# Patient Record
Sex: MALE | Race: OTHER
[De-identification: names, ages, dates, MRNs, and addresses within clinical notes are randomized per-mention and may not be internally consistent; named-entity substitution may affect disease eponyms.]

---

## 2020-12-04 ENCOUNTER — HOSPITAL ENCOUNTER (EMERGENCY)
Dept: HOSPITAL 62 - ER | Age: 30
Discharge: HOME | End: 2020-12-04
Payer: SELF-PAY

## 2020-12-04 VITALS — SYSTOLIC BLOOD PRESSURE: 131 MMHG | DIASTOLIC BLOOD PRESSURE: 73 MMHG

## 2020-12-04 DIAGNOSIS — S62.637B: Primary | ICD-10-CM
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
## 2020-12-04 PROCEDURE — 73140 X-RAY EXAM OF FINGER(S): CPT

## 2020-12-04 PROCEDURE — 12031 INTMD RPR S/A/T/EXT 2.5 CM/<: CPT

## 2020-12-04 PROCEDURE — 99283 EMERGENCY DEPT VISIT LOW MDM: CPT

## 2020-12-04 NOTE — RADIOLOGY REPORT (SQ)
EXAM DESCRIPTION:  FINGER LEFT



IMAGES COMPLETED DATE/TIME:  12/4/2020 1:57 pm



REASON FOR STUDY:  laceration



COMPARISON:  None.



NUMBER OF VIEWS:  Three views.



TECHNIQUE:  AP, lateral, and oblique images acquired of the left fifth finger.



LIMITATIONS:  None.



FINDINGS:  MINERALIZATION: Normal.

BONES: Acute comminuted displaced fracture of the tuft of the 5th distal phalanx.  There is no other 
fracture or associated dislocation.

SOFT TISSUES: Soft tissue swelling around the fractured 5th digit.

OTHER: No other findings.



IMPRESSION:  Acute comminuted displaced fracture of the tuft of the 5th distal phalanx.



TECHNICAL DOCUMENTATION:  JOB ID:  [id number]

 2011 [employer]- All Rights Reserved



Reading location - IP/workstation name: [email or web address]

## 2020-12-04 NOTE — ER DOCUMENT REPORT
ED Hand/Wrist Injury





- General


Chief Complaint: Finger Injury


Stated Complaint: FINGER INJURY


Time Seen by Provider: 12/04/20 13:37


Mode of Arrival: Ambulatory


Information source: Patient


Notes: 





30-year-old male presented to ED for laceration to his left finger.  He states 

he was trying to cut vegetables when he cut his finger with a knife.  He states 

his last tetanus was in 2017.  He states the pain is a 3 out of 5 at this time 

sharp.  He did cut through the fingernail on the fifth finger it is actively 

bleeding.  He is alert oriented respirations regular nonlabored speaking in full

sentences.














Constitutional: Negative for fever.


HENT: Negative for sore throat.


Eyes: Negative for visual changes.


Cardiovascular: Negative for chest pain.


Respiratory: Negative for shortness of breath.


Gastrointestinal: Negative for abdominal pain, vomiting or diarrhea.


Genitourinary: Negative for dysuria.


Musculoskeletal: Negative for back pain.


Skin: Laceration to the nail into the finger of the left fifth finger


Neurological: Negative for headaches, weakness or numbness.





10 point ROS negative except as marked above and in HPI.














PHYSICAL EXAMINATION:





GENERAL: Well-appearing, well-nourished and in no acute distress.





HEAD: Atraumatic, normocephalic.





EYES: Pupils equal round extraocular movements intact,  conjunctiva are normal.





ENT: Nares patent





NECK: Normal range of motion





LUNGS: No respiratory distress





Musculoskeletal: Normal range of motion





NEUROLOGICAL:  Normal speech, normal gait. 





PSYCH: Normal mood, normal affect.





SKIN: Laceration to the nail into the finger on the left fifth finger it is 

actively bleeding





- HPI


Injury to: Small finger


Onset: Just prior to arrival


Where: Home, Indoors


Timing: Still present


Quality of pain: Achy, Sharp


Severity: Moderate


Pain Level: 3


Context: Laceration





Past Medical History





- General


Information source: Patient





- Social History


Smoking Status: Never Smoker


Frequency of alcohol use: Rare


Drug Abuse: None


Occupation: [employer]


Lives with: Family


Family History: Reviewed & Not Pertinent


Patient has suicidal ideation: No


Patient has homicidal ideation: No





- Past Medical History


Cardiac Medical History: Reports: None


Pulmonary Medical History: Reports: None


EENT Medical History: Reports: None


Neurological Medical History: Reports: None


Endocrine Medical History: Reports: None


Renal/ Medical History: Reports: None


Malignancy Medical History: Reports None


GI Medical History: Reports: None


Musculoskeletal Medical History: Reports Hx Musculoskeletal Deformity, Reports 

Hx Musculoskeletal Trauma


Skin Medical History: Reports None


Psychiatric Medical History: Reports: None


Traumatic Medical History: Reports: Hx Fractures - Left knee right arm


Infectious Medical History: Reports: None


Past Surgical History: Reports: Hx Orthopedic Surgery - Left knee right arm





- Immunizations


Immunizations up to date: Yes


Hx Diphtheria, Pertussis, Tetanus Vaccination: Yes - 2017





Physical Exam





- Vital signs


Vitals: 





                                        











Temp Pulse Resp BP Pulse Ox


 


 98.8 F   85   16   148/80 H  100 


 


 12/04/20 13:25  12/04/20 13:25  12/04/20 13:25  12/04/20 13:25  12/04/20 13:25














Course





- Re-evaluation


Re-evalutation: 





12/04/20 13:52


He does have a laceration to the nail of the left fifth finger.  We will x-ray 

to ensure whether there is a bone involvement or not and then we will treat the 

laceration.





- Vital Signs


Vital signs: 





                                        











Temp Pulse Resp BP Pulse Ox


 


 98.8 F   85   16   148/80 H  100 


 


 12/04/20 13:25  12/04/20 13:25  12/04/20 13:25  12/04/20 13:25  12/04/20 13:25

## 2020-12-04 NOTE — ER DOCUMENT REPORT
ED Medical Screen (RME)





- General


Chief Complaint: Finger Injury


Stated Complaint: FINGER INJURY


Time Seen by Provider: 12/04/20 13:37


Mode of Arrival: Ambulatory


Notes: 








30-year-old male presented to ED for laceration to his left finger.  He states 

he was trying to cut vegetables when he cut his finger with a knife.  He states 

his last tetanus was in 2017.  He states the pain is a 3 out of 5 at this time 

sharp.  He did cut through the fingernail on the fifth finger it is actively 

bleeding.  He is alert oriented respirations regular nonlabored speaking in full

sentences.  This is a comminuted fracture of the end of the finger.  He will be 

seen by another provider.








I have greeted and performed a rapid initial assessment of this patient.  A 

comprehensive ED assessment and evaluation of the patient, analysis of test 

results and completion of medical decision making process will be conducted by 

an additional ED providers.





- HPI


Onset: Just prior to arrival


Onset/Duration: Sudden


Quality of pain: Sharp


Severity: Moderate


Pain Level: 3





Past Medical History





- Social History


Frequency of alcohol use: Rare


Drug Abuse: None





- Past Medical History


Cardiac Medical History: Reports: None


Pulmonary Medical History: Reports: None


EENT Medical History: Reports: None


Neurological Medical History: Reports: None


Endocrine Medical History: Reports: None


Renal/ Medical History: Reports: None


Malignancy Medical History: Reports None


GI Medical History: Reports: None


Musculoskeltal Medical History: Reports Hx Musculoskeletal Deformity, Reports Hx

Musculoskeletal Trauma


Skin Medical History: Reports None


Psychiatric Medical History: Reports: None


Traumatic Medical History: Reports: Hx Fractures - Left knee right arm


Infectious Medical History: Reports: None


Past Surgical History: Reports: Hx Orthopedic Surgery - Left knee right arm





- Immunizations


Immunizations up to date: Yes


Hx Diphtheria, Pertussis, Tetanus Vaccination: Yes - 2017





Physical Exam





- Vital signs


Vitals: 





                                        











Temp Pulse Resp BP Pulse Ox


 


 98.8 F   85   16   148/80 H  100 


 


 12/04/20 13:25  12/04/20 13:25  12/04/20 13:25  12/04/20 13:25  12/04/20 13:25














Course





- Vital Signs


Vital signs: 





                                        











Temp Pulse Resp BP Pulse Ox


 


 98.8 F   85   16   148/80 H  100 


 


 12/04/20 13:25  12/04/20 13:25  12/04/20 13:25  12/04/20 13:25  12/04/20 13:25

## 2020-12-04 NOTE — ER DOCUMENT REPORT
ED Hand/Wrist Injury





- General


Chief Complaint: Laceration


Stated Complaint: FINGER INJURY


Time Seen by Provider: 12/04/20 13:37


Mode of Arrival: Ambulatory


Notes: 





HPI: 30-year-old male who excellently cut his distal tip of his left pinky 

finger with a knife.  Tetanus is up-to-date from previous injury.  Patient 

denies pain to any other location.








ROS:  See HPI








Reviewed vital signs and nursing note as charted by RN.





PHYSICAL EXAM: 





CONSTITUTIONAL: Alert and oriented and responds appropriately to questions. 

Well-appearing; well-nourished











EXT: Patient appears to have a complete avulsion of the pinky nail with a 

nailbed horizontal laceration.  We will order an x-ray.








- Related Data


Allergies/Adverse Reactions: 


                                        





No Known Allergies Allergy (Verified 12/04/20 15:56)


   











Past Medical History





- Social History


Smoking Status: Never Smoker


Frequency of alcohol use: Rare


Drug Abuse: None


Family History: Reviewed & Not Pertinent





- Past Medical History


Cardiac Medical History: Reports: None


Pulmonary Medical History: Reports: None


EENT Medical History: Reports: None


Neurological Medical History: Reports: None


Endocrine Medical History: Reports: None


Renal/ Medical History: Reports: None


Malignancy Medical History: Reports None


GI Medical History: Reports: None


Musculoskeletal Medical History: Reports Hx Musculoskeletal Deformity, Reports 

Hx Musculoskeletal Trauma


Skin Medical History: Reports None


Psychiatric Medical History: Reports: None


Traumatic Medical History: Reports: Hx Fractures - Left knee right arm


Infectious Medical History: Reports: None


Past Surgical History: Reports: Hx Orthopedic Surgery - Left knee right arm





- Immunizations


Immunizations up to date: Yes


Hx Diphtheria, Pertussis, Tetanus Vaccination: Yes - 2017





Physical Exam





- Vital signs


Vitals: 





                                        











Temp Pulse Resp BP Pulse Ox


 


 98.8 F   85   16   148/80 H  100 


 


 12/04/20 13:25  12/04/20 13:25  12/04/20 13:25  12/04/20 13:25  12/04/20 13:25














Course





- Re-evaluation


Re-evalutation: 





12/04/20 18:04


Given the above history and physical, I did call the hand surgeon here locally 

and discussed the case.  He is comfortable with me suturing the nailbed and 

using Xeroform gauze underneath the germinal layer.  He does recommend 

antibiotics and splinting.





Patient has been sutured appropriately.  It was a complex laceration.  Please 

see laceration note.  I have started the patient on Keflex and patient will be 

discharged home with strict return precautions and follow-up with orthopedics.





- Vital Signs


Vital signs: 





                                        











Temp Pulse Resp BP Pulse Ox


 


 98.8 F   85   16   148/80 H  100 


 


 12/04/20 13:25  12/04/20 13:25  12/04/20 13:25  12/04/20 13:25  12/04/20 13:25














Procedures





- Laceration/Wound Repair


  ** Left Finger


Wound length (cm): 1


Wound's Depth, Shape: Irregular, Nail-avulsed, Other - With a nailbed laceration


Laceration pre-procedure: Other


Anesthetic type: 1% Lidocaine


Volume Anesthetic (mLs): 8


Irrigated w/ Saline (mLs): 1,000


Wound Debrided: Minimal


Wound Repaired With: Sutures


Number of Sutures: 6


Post-procedure NV exam normal: Yes


Complications: No


Notes: 





12/04/20 18:06


I did perform a digital block of the left pinky finger using lidocaine without 

epinephrine with good anesthesia uptake.





I initially used 5-0 Chromic Gut and placed three sutures to repair the 

horizontal laceration that was around 1 cm in length to the pinky nailbed.  I 

then used 5-0 Prolene and placed 3 more sutures to hold the Xeroform gauze under

the germinal layer.











Discharge





- Discharge


Clinical Impression: 


 Nail avulsion, Open fracture of distal phalangeal tuft with malunion





Nailbed laceration, finger


Qualifiers:


 Encounter type: initial encounter Qualified Code(s): S61.319A - Laceration 

without foreign body of unspecified finger with damage to nail, initial 

encounter





Laceration of left little finger


Qualifiers:


 Encounter type: initial encounter Damage to nail status: with damage Foreign 

body presence: without foreign body Qualified Code(s): S61.317A - Laceration 

without foreign body of left little finger with damage to nail, initial 

encounter





Condition: Good


Disposition: HOME, SELF-CARE


Additional Instructions: 


Come back immediately for any increased pain, swelling, discoloration of the 

finger, fevers, or any other acute problems.  Please follow-up with orthopedics 

as we have helped expedite for you.  


Prescriptions: 


Cephalexin Monohydrate [Keflex 500 mg Capsule] 500 mg PO Q8H 7 Days #21 capsule


Hydrocodone/Acetaminophen [Norco 5-325 mg Tablet] 1 tab PO Q8 #10 tablet


Referrals: 


AMY FUNG,  [ACTIVE STAFF] - Follow up as needed